# Patient Record
Sex: FEMALE | Race: WHITE
[De-identification: names, ages, dates, MRNs, and addresses within clinical notes are randomized per-mention and may not be internally consistent; named-entity substitution may affect disease eponyms.]

---

## 2018-06-23 ENCOUNTER — HOSPITAL ENCOUNTER (EMERGENCY)
Dept: HOSPITAL 17 - NEPE | Age: 58
LOS: 1 days | Discharge: HOME | End: 2018-06-24
Payer: COMMERCIAL

## 2018-06-23 VITALS
DIASTOLIC BLOOD PRESSURE: 62 MMHG | TEMPERATURE: 98.2 F | OXYGEN SATURATION: 98 % | SYSTOLIC BLOOD PRESSURE: 112 MMHG | RESPIRATION RATE: 20 BRPM | HEART RATE: 69 BPM

## 2018-06-23 VITALS — OXYGEN SATURATION: 99 %

## 2018-06-23 VITALS — HEIGHT: 62 IN | BODY MASS INDEX: 26.37 KG/M2 | WEIGHT: 143.3 LBS

## 2018-06-23 VITALS
HEART RATE: 68 BPM | SYSTOLIC BLOOD PRESSURE: 127 MMHG | OXYGEN SATURATION: 98 % | RESPIRATION RATE: 16 BRPM | DIASTOLIC BLOOD PRESSURE: 70 MMHG

## 2018-06-23 VITALS
HEART RATE: 68 BPM | SYSTOLIC BLOOD PRESSURE: 127 MMHG | RESPIRATION RATE: 18 BRPM | DIASTOLIC BLOOD PRESSURE: 54 MMHG | OXYGEN SATURATION: 99 %

## 2018-06-23 VITALS
SYSTOLIC BLOOD PRESSURE: 112 MMHG | RESPIRATION RATE: 18 BRPM | DIASTOLIC BLOOD PRESSURE: 62 MMHG | OXYGEN SATURATION: 99 % | HEART RATE: 71 BPM

## 2018-06-23 DIAGNOSIS — F32.9: ICD-10-CM

## 2018-06-23 DIAGNOSIS — S01.112A: ICD-10-CM

## 2018-06-23 DIAGNOSIS — F17.200: ICD-10-CM

## 2018-06-23 DIAGNOSIS — I10: ICD-10-CM
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
## 2018-06-23 DIAGNOSIS — Z79.899: ICD-10-CM

## 2018-06-23 DIAGNOSIS — F10.129: Primary | ICD-10-CM

## 2018-06-23 DIAGNOSIS — I25.10: ICD-10-CM

## 2018-06-23 LAB
ALBUMIN SERPL-MCNC: 3.4 GM/DL (ref 3.4–5)
ALP SERPL-CCNC: 59 U/L (ref 45–117)
ALT SERPL-CCNC: 19 U/L (ref 10–53)
AST SERPL-CCNC: 16 U/L (ref 15–37)
BASOPHILS # BLD AUTO: 0.1 TH/MM3 (ref 0–0.2)
BASOPHILS NFR BLD: 0.6 % (ref 0–2)
BILIRUB SERPL-MCNC: 0.3 MG/DL (ref 0.2–1)
BUN SERPL-MCNC: 14 MG/DL (ref 7–18)
CALCIUM SERPL-MCNC: 8.5 MG/DL (ref 8.5–10.1)
CHLORIDE SERPL-SCNC: 100 MEQ/L (ref 98–107)
CREAT SERPL-MCNC: 0.77 MG/DL (ref 0.5–1)
EOSINOPHIL # BLD: 0.2 TH/MM3 (ref 0–0.4)
EOSINOPHIL NFR BLD: 1.8 % (ref 0–4)
ERYTHROCYTE [DISTWIDTH] IN BLOOD BY AUTOMATED COUNT: 14.2 % (ref 11.6–17.2)
GFR SERPLBLD BASED ON 1.73 SQ M-ARVRAT: 76 ML/MIN (ref 89–?)
GLUCOSE SERPL-MCNC: 109 MG/DL (ref 74–106)
HCO3 BLD-SCNC: 19.6 MEQ/L (ref 21–32)
HCT VFR BLD CALC: 42.1 % (ref 35–46)
HGB BLD-MCNC: 14.4 GM/DL (ref 11.6–15.3)
LYMPHOCYTES # BLD AUTO: 4 TH/MM3 (ref 1–4.8)
LYMPHOCYTES NFR BLD AUTO: 39.3 % (ref 9–44)
MCH RBC QN AUTO: 31.9 PG (ref 27–34)
MCHC RBC AUTO-ENTMCNC: 34.3 % (ref 32–36)
MCV RBC AUTO: 93 FL (ref 80–100)
MONOCYTE #: 0.6 TH/MM3 (ref 0–0.9)
MONOCYTES NFR BLD: 6 % (ref 0–8)
NEUTROPHILS # BLD AUTO: 5.3 TH/MM3 (ref 1.8–7.7)
NEUTROPHILS NFR BLD AUTO: 52.3 % (ref 16–70)
PLATELET # BLD: 255 TH/MM3 (ref 150–450)
PMV BLD AUTO: 8.2 FL (ref 7–11)
PROT SERPL-MCNC: 6.6 GM/DL (ref 6.4–8.2)
RBC # BLD AUTO: 4.53 MIL/MM3 (ref 4–5.3)
SODIUM SERPL-SCNC: 133 MEQ/L (ref 136–145)
TROPONIN I SERPL-MCNC: (no result) NG/ML (ref 0.02–0.05)
WBC # BLD AUTO: 10.1 TH/MM3 (ref 4–11)

## 2018-06-23 PROCEDURE — 12011 RPR F/E/E/N/L/M 2.5 CM/<: CPT

## 2018-06-23 PROCEDURE — 72125 CT NECK SPINE W/O DYE: CPT

## 2018-06-23 PROCEDURE — 70450 CT HEAD/BRAIN W/O DYE: CPT

## 2018-06-23 PROCEDURE — 80053 COMPREHEN METABOLIC PANEL: CPT

## 2018-06-23 PROCEDURE — 93005 ELECTROCARDIOGRAM TRACING: CPT

## 2018-06-23 PROCEDURE — 96365 THER/PROPH/DIAG IV INF INIT: CPT

## 2018-06-23 PROCEDURE — 83690 ASSAY OF LIPASE: CPT

## 2018-06-23 PROCEDURE — 85025 COMPLETE CBC W/AUTO DIFF WBC: CPT

## 2018-06-23 PROCEDURE — 99285 EMERGENCY DEPT VISIT HI MDM: CPT

## 2018-06-23 PROCEDURE — 80307 DRUG TEST PRSMV CHEM ANLYZR: CPT

## 2018-06-23 PROCEDURE — 71045 X-RAY EXAM CHEST 1 VIEW: CPT

## 2018-06-23 PROCEDURE — 84484 ASSAY OF TROPONIN QUANT: CPT

## 2018-06-23 NOTE — RADRPT
EXAM DATE:  6/23/2018 8:32 PM EDT

AGE/SEX:        58 years / Female



INDICATIONS:  Trauma; fall.



CLINICAL DATA:  This is the patient's initial encounter. Patient reports that signs and symptoms have
 been present for 1 day and indicates a pain score of 4/10. 

                                                                          

MEDICAL/SURGICAL HISTORY:       Hypertension.  Cardiovascular disease. Hysterectomy.



RADIATION DOSE:  12.92 CTDI (mGy)







COMPARISON:      No prior exams available for comparison. 





TECHNIQUE:  Contiguous axial images were obtained using helical multirow detector technique.  The vol
umetric data was post-processed with multiplanar reconstruction in oblique axial, sagittal, and coron
al planes. Using automated exposure control and adjustment of the mA and/or kV according to patient s
ize, radiation dose was kept as low as reasonably achievable to obtain optimal diagnostic quality breana
ges.  DICOM format image data is available electronically for review and comparison. 



FINDINGS: 

No acute fracture or spondylolisthesis. Moderate degenerative changes at C5-6 with mild canal and for
aminal stenosis. No other canal stenosis. Normal alignment. No prevertebral soft tissue swelling.



CONCLUSION:

1.  No acute findings. Moderate degenerative change at C5-6 with mild stenosis.



Electronically signed by: Hector Medina MD  6/23/2018 8:51 PM EDT

## 2018-06-23 NOTE — RADRPT
EXAM DATE:  6/23/2018 8:27 PM EDT

AGE/SEX:        58 years / Female



INDICATIONS:  Trauma; fall.



CLINICAL DATA:  This is the patient's initial encounter. Patient reports that signs and symptoms have
 been present for 1 day and indicates a pain score of 4/10. 

                                                                          

MEDICAL/SURGICAL HISTORY:   Hypertension.  Cardiovascular disease. Hysterectomy.



RADIATION DOSE:  31.26 CTDI (mGy)







COMPARISON:      No prior exams available for comparison. 







TECHNIQUE:  CT of the head without contrast.  Using automated exposure control and adjustment of the 
mA and/or kV according to patient size, radiation dose was kept as low as reasonably achievable to ob
tain optimal diagnostic quality images.  DICOM format image data is available electronically for revi
ew and comparison. 



FINDINGS: 

Cerebrum:  The ventricles are normal for age.  No evidence of midline shift, mass lesion, hemorrhage 
or acute infarction.  No extraaxial fluid collections are seen.

Posterior Fossa:  The cerebellum and brainstem are intact.  The 4th ventricle is midline.  The cerebe
llopontine angle is unremarkable.

Extracranial:  The visualized portion of the orbits is intact.

Skull:  The calvaria is intact.  No evidence of skull fracture.



CONCLUSION:

1.  No acute intracranial abnormalities.



Electronically signed by: Hector Medina MD  6/23/2018 8:49 PM EDT

## 2018-06-23 NOTE — RADRPT
EXAM DATE:  6/23/2018 8:03 PM EDT

AGE/SEX:        58 years / Female



INDICATIONS:  Chest pain.



CLINICAL DATA:  This is the patient's initial encounter. Patient reports that signs and symptoms have
 been present for 1 day and indicates a pain score of 8/10. 

                                                                          

MEDICAL/SURGICAL HISTORY:       Hypertension. Hysterectomy.  Tonsillectomy.



COMPARISON:      No prior exams available for comparison. 





FINDINGS:  

A single AP view of the chest demonstrates the lungs to be symmetrically aerated without evidence of 
mass, infiltrate or effusion.  The cardiomediastinal contours are unremarkable.  Osseous structures a
re intact.  





CONCLUSION: 

No active disease.



Electronically signed by: Hector Medina MD  6/23/2018 8:32 PM EDT

## 2018-06-23 NOTE — PD
HPI


Chief Complaint:  Cardiac Complaint


Time Seen by Provider:  19:41


Travel History


International Travel<30 days:  No


Contact w/Intl Traveler<30days:  No


Traveled to known affect area:  No





History of Present Illness


HPI


This is a 58-year-old female who presents to the emergency department with 

chest discomfort that started while she was at the bar.  She describes it as 

heaviness in the left side of her chest, nonradiating, associated with nausea 

and shortness of breath ultimately causing her to pass out.  She hit her head 

and lost consciousness.  She says she has had a heart attack in the past.  She 

has a known blockage but did not want intervened on.  She has a cardiologist 

that she follows with.  She does acknowledge drinking alcohol today.





PFSH


Past Medical History


Anxiety:  Yes


Depression:  Yes


Coronary Artery Disease:  Yes


Diminished Hearing:  No


Hypertension:  Yes





Past Surgical History


Hysterectomy:  Yes


Tonsillectomy:  Yes


Other Surgery:  Yes (ORAL SURGERY)





Social History


Alcohol Use:  Yes ([employer] ON WEEKEND)


Tobacco Use:  Yes (ONE AND A HALF PPD)


Substance Use:  No





Allergies-Medications


(Allergen,Severity, Reaction):  


Coded Allergies:  


     benazepril (Unverified  Allergy, Mild, THROAT SWELLS, 6/23/18)


     captopril (Unverified  Allergy, Mild, THROAT SWELLS, 6/23/18)


     codeine (Unverified  Allergy, Mild, N/V, 6/23/18)


     enalaprilat (Unverified  Allergy, Mild, THROAT SWELLS, 6/23/18)


     fosinopril (Unverified  Allergy, Mild, THROAT SWELLS, 6/23/18)


     lisinopril (Unverified  Allergy, Mild, THROAT SWELLS, 6/23/18)


     quinapril (Unverified  Allergy, Mild, THROAT SWELLS, 6/23/18)


     Fish Containing Products (Unverified  Adverse Reaction, Severe, 6/23/18)


     iodine (Unverified  Adverse Reaction, Severe, 6/23/18)


     potassium iodide (Unverified  Adverse Reaction, Severe, 6/23/18)


     povidone-iodine (Unverified  Adverse Reaction, Severe, 6/23/18)


     sodium iodide (Unverified  Adverse Reaction, Severe, 6/23/18)


     sodium iodide (Unverified  Adverse Reaction, Severe, 6/23/18)


Uncoded Allergies:  


     CATS (Allergy, Mild, SWELLING, 12/1/16)


Reported Meds & Prescriptions





Reported Meds & Active Scripts


Active


Reported


Lovastatin 40 Mg Tab 40 Mg PO DAILY


Nitrostat SL (Nitroglycerin) 0.4 Mg Subl 0.4 Mg SL AS DIRECTED PRN


     1 tablet under the tongue as needed for chest pain. Repeat every 5


     minutes for a total of 3 DOSES or call 911 if NO relief.


Ventolin Hfa 18 GM Inh (Albuterol Sulfate) 90 Mcg/Act Aer 2 Puff INH Q6H PRN


Sertraline (Sertraline HCl) 50 Mg Tab 50 Mg PO HS


Fenofibric Acid Dr (Choline Fenofibrate DR) 135 mg Capdr 135 Mg PO HS


Amlodipine (Amlodipine Besylate) 5 Mg Tab 5 Mg PO DAILY


Carvedilol 6.25 Mg Tab 6.25 Mg PO BID








Review of Systems


Except as stated in HPI:  all other systems reviewed are Neg





Physical Exam


Narrative


GENERAL:Well appearing, no acute distress


SKIN: Focused skin assessment warm and dry.


HEAD: Atraumatic. Normocephalic. 


EYES: Pupils equal and round.  No injection or drainage. 


ENT:  Moist mucous membranes


NECK: Trachea midline. 


CARDIOVASCULAR: Regular rate and rhythm.  No murmur appreciated.


RESPIRATORY: Clear to auscultation. Breath sounds equal bilaterally. 


GASTROINTESTINAL: Abdomen soft, non-tender, nondistended. 


MUSCULOSKELETAL: No obvious deformities. 


NEUROLOGICAL: Awake and alert. No obvious cranial nerve deficits.  Moving all 

extremities.


PSYCHIATRIC: Appropriate mood and affect; insight and judgment normal.





Data


Data


Last Documented VS





Vital Signs








  Date Time  Temp Pulse Resp B/P (MAP) Pulse Ox O2 Delivery O2 Flow Rate FiO2


 


6/24/18 05:51 98.1 78 18 116/70 (85) 98 Room Air  








Orders





 Orders


Electrocardiogram (6/23/18 19:41)


Complete Blood Count With Diff (6/23/18 19:41)


Comprehensive Metabolic Panel (6/23/18 19:41)


Troponin I (6/23/18 19:41)


Lipase (6/23/18 19:41)


Chest, Single Ap (6/23/18 19:41)


Ecg Monitoring (6/23/18 19:41)


Bilateral Bp Monitoring (6/23/18 19:41)


Iv Access Insert/Monitor (6/23/18 19:41)


Oximetry (6/23/18 19:41)


Oxygen Administration (6/23/18 19:41)


Sodium Chloride 0.9% Flush (Ns Flush) (6/23/18 19:45)


Ct Brain W/O Iv Contrast(Rout) (6/23/18 )


Ct Cerv Spine W/O Contrast (6/23/18 )


Apply Cervical Collar (6/23/18 19:41)


Acetaminophen (Tylenol) (6/23/18 19:45)


Alcohol (Ethanol) (6/23/18 19:47)


Sodium Chlor 0.9% 1000 Ml Inj (Ns 1000 M (6/23/18 22:45)


Thiamine Inj (Thiamine Inj) (6/23/18 22:45)


Troponin I (6/23/18 23:19)





Labs





Laboratory Tests








Test


  6/23/18


19:45 6/23/18


23:30


 


White Blood Count 10.1 TH/MM3  


 


Red Blood Count 4.53 MIL/MM3  


 


Hemoglobin 14.4 GM/DL  


 


Hematocrit 42.1 %  


 


Mean Corpuscular Volume 93.0 FL  


 


Mean Corpuscular Hemoglobin 31.9 PG  


 


Mean Corpuscular Hemoglobin


Concent 34.3 % 


  


 


 


Red Cell Distribution Width 14.2 %  


 


Platelet Count 255 TH/MM3  


 


Mean Platelet Volume 8.2 FL  


 


Neutrophils (%) (Auto) 52.3 %  


 


Lymphocytes (%) (Auto) 39.3 %  


 


Monocytes (%) (Auto) 6.0 %  


 


Eosinophils (%) (Auto) 1.8 %  


 


Basophils (%) (Auto) 0.6 %  


 


Neutrophils # (Auto) 5.3 TH/MM3  


 


Lymphocytes # (Auto) 4.0 TH/MM3  


 


Monocytes # (Auto) 0.6 TH/MM3  


 


Eosinophils # (Auto) 0.2 TH/MM3  


 


Basophils # (Auto) 0.1 TH/MM3  


 


CBC Comment DIFF FINAL  


 


Differential Comment   


 


Blood Urea Nitrogen 14 MG/DL  


 


Creatinine 0.77 MG/DL  


 


Random Glucose 109 MG/DL  


 


Total Protein 6.6 GM/DL  


 


Albumin 3.4 GM/DL  


 


Calcium Level 8.5 MG/DL  


 


Alkaline Phosphatase 59 U/L  


 


Aspartate Amino Transf


(AST/SGOT) 16 U/L 


  


 


 


Alanine Aminotransferase


(ALT/SGPT) 19 U/L 


  


 


 


Total Bilirubin 0.3 MG/DL  


 


Sodium Level 133 MEQ/L  


 


Potassium Level 3.2 MEQ/L  


 


Chloride Level 100 MEQ/L  


 


Carbon Dioxide Level 19.6 MEQ/L  


 


Anion Gap 13 MEQ/L  


 


Estimat Glomerular Filtration


Rate 76 ML/MIN 


  


 


 


Troponin I


  LESS THAN 0.02


NG/ML LESS THAN 0.02


NG/ML


 


Lipase 91 U/L  


 


Ethyl Alcohol Level 253 MG/DL  











[facility]


Medical Decision Making


Medical Screen Exam Complete:  Yes


Emergency Medical Condition:  Yes


Interpretation(s)


EKG: LBBB


no leukocytosis


mild hyponatremia, mild hypokalemia


troponin neg x2


alcohol 253





Last 24 hours Impressions








Chest X-Ray 6/23/18 1941 Signed





Impressions: 





 CONCLUSION: 





 No active disease.





  





 


 


Head CT 6/23/18 0000 Signed





Impressions: 





 CONCLUSION:





 1.  No acute intracranial abnormalities.





  





 


 


Cervical Spine CT 6/23/18 0000 Signed





Impressions: 





 CONCLUSION:





 1.  No acute findings. Moderate degenerative change at C5-6 with mild stenosis.





 





  





 








Differential Diagnosis


Intracranial hemorrhage, cervical spine fracture, myocardial infarction, 

arrhythmia, electrolyte abnormality


Narrative Course


This is a 58-year-old female who presents to the emergency department 

intoxicated who had an episode of chest discomfort and syncope.  She is placed 

in a monitor and an IV was established.  Labs are reassuring including troponin 

which is negative 2.  CT of the head and cervical spine are unremarkable.  

Patient was evaluated when sober.  She is a patient of Dr. Christina.  She has 

been told she has a blockage but she does not want a catheterization and she 

has discussed this with him in the past.  I offered her admission but she 

declined because she does not want a cardiac catheterization.. She has decision 

making capacity on my assessment.  I did [place] her on alcohol cessation





Procedures


**Procedure Narrative**


Laceration repair: 2 cm laceration over the left eyebrow was repaired using 

Steri-Strips and glue.





Diagnosis





 Primary Impression:  


 Alcohol intoxication


 Qualified Codes:  F10.920 - Alcohol use, unspecified with intoxication, 

uncomplicated


 Additional Impression:  


 Syncope


 Qualified Codes:  R55 - Syncope and collapse


Patient Instructions:  General Instructions





***Additional Instructions:  


Follow up with Isabella Coronel in regards to psychiatric or substance related 

issues at:


[phone]


[address] [place] [id number]


***Med/Other Pt SpecificInfo:  No Change to Meds


Disposition:  01 DISCHARGE HOME


Condition:  Stable











Vanessa Taylor MD Jun 23, 2018 19:47

## 2018-06-24 VITALS
TEMPERATURE: 98.1 F | DIASTOLIC BLOOD PRESSURE: 70 MMHG | RESPIRATION RATE: 18 BRPM | SYSTOLIC BLOOD PRESSURE: 116 MMHG | HEART RATE: 78 BPM | OXYGEN SATURATION: 98 %

## 2018-06-24 VITALS
HEART RATE: 78 BPM | SYSTOLIC BLOOD PRESSURE: 117 MMHG | RESPIRATION RATE: 18 BRPM | DIASTOLIC BLOOD PRESSURE: 67 MMHG | OXYGEN SATURATION: 99 %

## 2018-06-24 NOTE — EKG
Date Performed: 2018       Time Performed: 19:36:06

 

PTAGE:      58 years

 

EKG:      SINUS BRADYCARDIA POSSIBLE LEFT ATRIAL ENLARGEMENT LEFT BUNDLE BRANCH BLOCK ABNORMAL ECG Co
mpared to 

 

 PREVIOUS TRACING            , left bundle branch block is new. PREVIOUS TRACIN2007 09.48

 

DOCTOR:   Lauri Anguiano  Interpretating Date/Time  2018 14:32:07